# Patient Record
Sex: MALE | Race: WHITE | NOT HISPANIC OR LATINO | Employment: OTHER | URBAN - METROPOLITAN AREA
[De-identification: names, ages, dates, MRNs, and addresses within clinical notes are randomized per-mention and may not be internally consistent; named-entity substitution may affect disease eponyms.]

---

## 2022-05-16 ENCOUNTER — TELEPHONE (OUTPATIENT)
Dept: UROLOGY | Facility: CLINIC | Age: 27
End: 2022-05-16
Payer: MEDICARE

## 2022-05-25 DIAGNOSIS — Z93.59 SUPRAPUBIC CATHETER: Primary | ICD-10-CM

## 2022-05-26 ENCOUNTER — TELEPHONE (OUTPATIENT)
Dept: UROLOGY | Facility: CLINIC | Age: 27
End: 2022-05-26
Payer: MEDICARE

## 2022-05-26 NOTE — TELEPHONE ENCOUNTER
----- Message from Steffi Cabrera sent at 5/25/2022  4:59 PM CDT -----    ----- Message -----  From: Leslie Skelton  Sent: 5/25/2022  12:15 PM CDT  To: Samir Cota Staff    Type:  Needs Medical Advice    Who Called: Rehan Villareal    Symptoms (please be specific): -   How long has patient had these symptoms:  -  Pharmacy name and phone #:  -  Would the patient rather a call back or a response via [facility]?    Best Call Back Number: [phone]  Additional Information: pt's father Rehan Villareal wants to speak w/ Catherine to know if pt can be seen sooner than 06/20/2022 please call

## 2022-06-30 ENCOUNTER — OFFICE VISIT (OUTPATIENT)
Dept: UROLOGY | Facility: CLINIC | Age: 27
End: 2022-06-30
Payer: MEDICARE

## 2022-06-30 VITALS
DIASTOLIC BLOOD PRESSURE: 58 MMHG | WEIGHT: 116 LBS | HEIGHT: 64 IN | HEART RATE: 58 BPM | SYSTOLIC BLOOD PRESSURE: 133 MMHG | BODY MASS INDEX: 19.81 KG/M2

## 2022-06-30 DIAGNOSIS — R33.9 URINARY RETENTION: Primary | ICD-10-CM

## 2022-06-30 PROCEDURE — 99204 PR OFFICE/OUTPT VISIT, NEW, LEVL IV, 45-59 MIN: ICD-10-PCS | Mod: S$GLB,,, | Performed by: UROLOGY

## 2022-06-30 PROCEDURE — 99204 OFFICE O/P NEW MOD 45 MIN: CPT | Mod: S$GLB,,, | Performed by: UROLOGY

## 2022-06-30 NOTE — PROGRESS NOTES
Subjective:       Patient ID: Rehan Villareal is a 27 y.o. male.    Chief Complaint: Suprapubic tube      HPI:  27-year-old male with cerebral palsy and severe mental retardation unable urinate has had a suprapubic tube for many years he requires sedation to have this changed as he gets combative.  He is here in clinic for establishment of care    Past Medical History: History reviewed. No pertinent past medical history.    Past Surgical Historical: History reviewed. No pertinent surgical history.     Medications:      Past Social History:   Social History     Socioeconomic History    Marital status: Unknown       Allergies:   Review of patient's allergies indicates:   Allergen Reactions    Augmentin [amoxicillin-pot clavulanate]     Ketamine     Penicillins     Phenergan dm         Family History: History reviewed. No pertinent family history.     Review of Systems:  Review of Systems   Constitutional: Negative for activity change and appetite change.   HENT: Negative for congestion and dental problem.    Eyes: Negative for visual disturbance.   Respiratory: Negative for chest tightness and shortness of breath.    Cardiovascular: Negative for chest pain.   Gastrointestinal: Negative for abdominal distention and abdominal pain.   Genitourinary: Negative for decreased urine volume, difficulty urinating, dysuria, enuresis, flank pain, frequency, genital sores, hematuria, penile discharge, penile pain, penile swelling, scrotal swelling, testicular pain and urgency.   Musculoskeletal: Negative for back pain and neck pain.   Skin: Negative for color change.   Neurological: Negative for dizziness.   Hematological: Negative for adenopathy.   Psychiatric/Behavioral: Negative for agitation, behavioral problems and confusion.       Physical Exam:  Physical Exam  Constitutional:       General: He is not in acute distress.     Appearance: He is well-developed.   HENT:      Head: Normocephalic and atraumatic.      Nose: Nose  normal.   Eyes:      General: No scleral icterus.     Conjunctiva/sclera: Conjunctivae normal.      Pupils: Pupils are equal, round, and reactive to light.   Neck:      Thyroid: No thyromegaly.      Trachea: No tracheal deviation.   Cardiovascular:      Rate and Rhythm: Normal rate and regular rhythm.      Heart sounds: Normal heart sounds.   Pulmonary:      Effort: Pulmonary effort is normal. No respiratory distress.      Breath sounds: Normal breath sounds. No wheezing or rales.   Abdominal:      General: Bowel sounds are normal. There is no distension.      Palpations: Abdomen is soft.      Tenderness: There is no abdominal tenderness. There is no guarding or rebound.   Genitourinary:     Penis: Normal. No tenderness.       Prostate: Normal.   Musculoskeletal:         General: No deformity. Normal range of motion.      Cervical back: Neck supple.   Lymphadenopathy:      Cervical: No cervical adenopathy.   Skin:     General: Skin is warm and dry.      Findings: No erythema or rash.   Neurological:      Mental Status: He is alert and oriented to person, place, and time.      Cranial Nerves: No cranial nerve deficit.   Psychiatric:         Behavior: Behavior normal.         Assessment/Plan:       Problem List Items Addressed This Visit    None     Visit Diagnoses     Urinary retention    -  Primary             Urinary retention and him patient with severe MR:  We will set the patient up for suprapubic tube change in the hospital

## 2022-07-05 DIAGNOSIS — Z93.59 SUPRAPUBIC CATHETER: Primary | ICD-10-CM

## 2022-09-20 ENCOUNTER — TELEPHONE (OUTPATIENT)
Dept: UROLOGY | Facility: CLINIC | Age: 27
End: 2022-09-20
Payer: MEDICARE

## 2022-09-20 NOTE — TELEPHONE ENCOUNTER
Attemptd to contact pt father(also our pt needing jailyn same day as son.) left VM to return my call.

## 2022-10-04 ENCOUNTER — OFFICE VISIT (OUTPATIENT)
Dept: UROLOGY | Facility: CLINIC | Age: 27
End: 2022-10-04
Payer: MEDICARE

## 2022-10-04 VITALS
BODY MASS INDEX: 19.81 KG/M2 | WEIGHT: 116 LBS | DIASTOLIC BLOOD PRESSURE: 71 MMHG | HEIGHT: 64 IN | HEART RATE: 81 BPM | SYSTOLIC BLOOD PRESSURE: 125 MMHG

## 2022-10-04 DIAGNOSIS — Z93.59 SUPRAPUBIC CATHETER: Primary | ICD-10-CM

## 2022-10-04 PROCEDURE — 3008F BODY MASS INDEX DOCD: CPT | Mod: CPTII,S$GLB,, | Performed by: UROLOGY

## 2022-10-04 PROCEDURE — 3078F DIAST BP <80 MM HG: CPT | Mod: CPTII,S$GLB,, | Performed by: UROLOGY

## 2022-10-04 PROCEDURE — 1160F RVW MEDS BY RX/DR IN RCRD: CPT | Mod: CPTII,S$GLB,, | Performed by: UROLOGY

## 2022-10-04 PROCEDURE — 3074F SYST BP LT 130 MM HG: CPT | Mod: CPTII,S$GLB,, | Performed by: UROLOGY

## 2022-10-04 PROCEDURE — 3008F PR BODY MASS INDEX (BMI) DOCUMENTED: ICD-10-PCS | Mod: CPTII,S$GLB,, | Performed by: UROLOGY

## 2022-10-04 PROCEDURE — 99214 OFFICE O/P EST MOD 30 MIN: CPT | Mod: S$GLB,,, | Performed by: UROLOGY

## 2022-10-04 PROCEDURE — 1159F MED LIST DOCD IN RCRD: CPT | Mod: CPTII,S$GLB,, | Performed by: UROLOGY

## 2022-10-04 PROCEDURE — 3074F PR MOST RECENT SYSTOLIC BLOOD PRESSURE < 130 MM HG: ICD-10-PCS | Mod: CPTII,S$GLB,, | Performed by: UROLOGY

## 2022-10-04 PROCEDURE — 1159F PR MEDICATION LIST DOCUMENTED IN MEDICAL RECORD: ICD-10-PCS | Mod: CPTII,S$GLB,, | Performed by: UROLOGY

## 2022-10-04 PROCEDURE — 1160F PR REVIEW ALL MEDS BY PRESCRIBER/CLIN PHARMACIST DOCUMENTED: ICD-10-PCS | Mod: CPTII,S$GLB,, | Performed by: UROLOGY

## 2022-10-04 PROCEDURE — 3078F PR MOST RECENT DIASTOLIC BLOOD PRESSURE < 80 MM HG: ICD-10-PCS | Mod: CPTII,S$GLB,, | Performed by: UROLOGY

## 2022-10-04 PROCEDURE — 99214 PR OFFICE/OUTPT VISIT, EST, LEVL IV, 30-39 MIN: ICD-10-PCS | Mod: S$GLB,,, | Performed by: UROLOGY

## 2022-10-04 RX ORDER — CEFUROXIME AXETIL 500 MG/1
500 TABLET ORAL 2 TIMES DAILY
COMMUNITY
Start: 2022-09-24

## 2022-10-04 NOTE — PROGRESS NOTES
Subjective:       Patient ID: Rehan Villareal Jr. is a 27 y.o. male.    Chief Complaint: SP TUBE (CONSENTS)      HPI:  27-year-old male with urinary retention suprapubic tube in place patient is severely mentally retarded and combative with suprapubic tube changes he is here for preop for anesthesia with suprapubic tube change    Past Medical History:   Past Medical History:   Diagnosis Date    Mental disorder     MRSA (methicillin resistant Staphylococcus aureus)     Seizures        Past Surgical Historical:   Past Surgical History:   Procedure Laterality Date    BRAIN SURGERY      G tube       VENTRICULOPERITONEAL SHUNT          Medications:   Medication List with Changes/Refills   Current Medications    ACETAMINOPHEN (TYLENOL) 500 MG TABLET    Take 500 mg by mouth every 6 (six) hours as needed for Pain.    CEFUROXIME (CEFTIN) 500 MG TABLET    Take 500 mg by mouth 2 (two) times daily.    CITALOPRAM (CELEXA) 20 MG TABLET    Take 20 mg by mouth once daily.    CLONAZEPAM (KLONOPIN) 2 MG TAB    Take 1 tablet (2 mg total) by mouth nightly.    CLONIDINE (CATAPRES) 0.1 MG TABLET    Take 0.1 mg by mouth once daily.    DIGOXIN (LANOXIN) 125 MCG TABLET    Take 0.125 mg by mouth every morning.    DOCUSATE SODIUM (COLACE) 100 MG CAPSULE    Take 100 mg by mouth 2 (two) times daily.    EUTHYROX 75 MCG TABLET    Take 75 mcg by mouth every morning.    FYCOMPA 6 MG TABLET    Take 6 mg by mouth daily as needed.    IBUPROFEN 200 MG CAP    Take by mouth.    LACOSAMIDE (VIMPAT) 200 MG TAB TABLET    Take 1 tablet (200 mg total) by mouth 2 (two) times daily.    LAMOTRIGINE (LAMICTAL) 100 MG TABLET    Take 1 tablet (100 mg total) by mouth 2 (two) times daily.    LEVETIRACETAM (KEPPRA) 500 MG TAB    Take 1 tablet (500 mg total) by mouth 2 (two) times daily.    MELATONIN 5 MG CAP    Take by mouth.    METOPROLOL SUCCINATE (TOPROL-XL) 25 MG 24 HR TABLET    Take 25 mg by mouth 2 (two) times daily.    MIRTAZAPINE (REMERON) 15 MG TABLET    Take 15  mg by mouth nightly.    MONTELUKAST (SINGULAIR) 10 MG TABLET    Take 10 mg by mouth nightly.    PANTOPRAZOLE (PROTONIX) 20 MG TABLET    SMARTSI Tablet(s) By Mouth Every Evening    PERAMPANEL (FYCOMPA) 6 MG TABLET    Take 1 tablet (6 mg total) by mouth every evening.        Past Social History:   Social History     Socioeconomic History    Marital status: Unknown   Tobacco Use    Smoking status: Never    Smokeless tobacco: Never   Substance and Sexual Activity    Alcohol use: Not Currently    Drug use: Not Currently       Allergies:   Review of patient's allergies indicates:   Allergen Reactions    Augmentin [amoxicillin-pot clavulanate]     Ketamine     Penicillins     Phenergan dm         Family History: History reviewed. No pertinent family history.     Review of Systems:  Review of Systems    Physical Exam:  Physical Exam    Assessment/Plan:       Problem List Items Addressed This Visit    None  Visit Diagnoses       Suprapubic catheter    -  Primary    Relevant Orders    Ambulatory Referral to External Surgery               What preop for 6 suprapubic tube change under anesthesia

## 2022-10-12 LAB
ANION GAP SERPL CALC-SCNC: 11 MMOL/L (ref 3–11)
APPEARANCE, UA: ABNORMAL
BILIRUB UR QL STRIP: NEGATIVE MG/DL
BUN SERPL-MCNC: 12 MG/DL (ref 7–18)
BUN/CREAT SERPL: 10.34 RATIO (ref 7–18)
CALCIUM SERPL-MCNC: 9.5 MG/DL (ref 8.8–10.5)
CELLS COUNTED: 100
CHLORIDE SERPL-SCNC: 102 MMOL/L (ref 100–108)
CO2 SERPL-SCNC: 28 MMOL/L (ref 21–32)
COLOR UR: ABNORMAL
CREAT SERPL-MCNC: 1.16 MG/DL (ref 0.7–1.3)
EOSINOPHIL NFR BLD: 13 % (ref 1–3)
ERYTHROCYTE [DISTWIDTH] IN BLOOD BY AUTOMATED COUNT: 11.3 % (ref 12.5–18)
GFR ESTIMATION: > 60
GLUCOSE (UA): NORMAL MG/DL
GLUCOSE SERPL-MCNC: 90 MG/DL (ref 70–110)
HCT VFR BLD AUTO: 41.6 % (ref 42–52)
HGB BLD-MCNC: 13.9 G/DL (ref 14–18)
HGB UR QL STRIP: 10 /UL
KETONES UR QL STRIP: NEGATIVE MG/DL
LEUKOCYTE ESTERASE UR QL STRIP: 500 /UL
LYMPHOCYTES NFR BLD: 53 % (ref 25–40)
MCH RBC QN AUTO: 31.9 PG (ref 27–31.2)
MCHC RBC AUTO-ENTMCNC: 33.4 G/DL (ref 31.8–35.4)
MCV RBC AUTO: 95.4 FL (ref 80–97)
MONOCYTES NFR BLD: 8 % (ref 1–15)
NEUTROPHILS # BLD AUTO: 2 10*3/UL (ref 1.8–7.7)
NEUTROPHILS NFR BLD: 26 % (ref 37–80)
NITRITE UR QL STRIP: NEGATIVE
NUCLEATED RED BLOOD CELLS: 0 %
PH UR STRIP: 8 PH (ref 5–9)
PLATELETS: 235 10*3/UL (ref 142–424)
POTASSIUM SERPL-SCNC: 4.5 MMOL/L (ref 3.6–5.2)
PROT UR QL STRIP: NEGATIVE MG/DL
RBC # BLD AUTO: 4.36 10*6/UL (ref 4.7–6.1)
RBC MORPH BLD: ABNORMAL
SMALL PLATELETS BLD QL SMEAR: ADEQUATE
SODIUM BLD-SCNC: 141 MMOL/L (ref 135–145)
SP GR UR STRIP: 1.01 (ref 1–1.03)
SPECIMEN COLLECTION METHOD, URINE: ABNORMAL
UROBILINOGEN UR STRIP-ACNC: NORMAL MG/DL
WBC # BLD: 7.7 10*3/UL (ref 4.6–10.2)

## 2022-10-17 ENCOUNTER — OUTSIDE PLACE OF SERVICE (OUTPATIENT)
Dept: UROLOGY | Facility: CLINIC | Age: 27
End: 2022-10-17

## 2022-10-17 PROCEDURE — 51705 PR CHANGE OF BLADDER TUBE,SIMPLE: ICD-10-PCS | Mod: ,,, | Performed by: UROLOGY

## 2022-10-17 PROCEDURE — 51705 CHANGE OF BLADDER TUBE: CPT | Mod: ,,, | Performed by: UROLOGY

## 2022-12-21 ENCOUNTER — TELEPHONE (OUTPATIENT)
Dept: UROLOGY | Facility: CLINIC | Age: 27
End: 2022-12-21

## 2022-12-21 NOTE — TELEPHONE ENCOUNTER
Mother rescheudled patient nurse visit to 12/27/2022, stated she would take patient to ER with symptoms, fever, etc.

## 2022-12-21 NOTE — TELEPHONE ENCOUNTER
----- Message from Kulwinder Tyler sent at 12/21/2022 11:26 AM CST -----  Contact: dad    Who Called:pt dad  Who Left Message for Patient:  Does the patient know what this is regarding?:pt wants to r/s nurse visit to tmr morning   Would the patient rather a call back or a response via [facility]?   Best Call Back Number:.[phone]    Additional Information:

## 2022-12-21 NOTE — TELEPHONE ENCOUNTER
Pt son needs to do a urine drop off to check for a UTI.  Will put on [place] schedule for urine drop off----- Message from Priscilla Sherwood sent at 12/21/2022  8:40 AM CST -----  Contact: Rehan Villareal Sr. - father to pt  Type:  Needs Medical Advice    Who Called: Rehan Villareal Sr. - father to pt  Symptoms (please be specific): UTI   How long has patient had these symptoms:  A few days - he has a super pubic catheter and usually gets them pretty bad, so needs to be started off on the stronger antibiotics  Pharmacy name and phone #:    [facility] - [place], [place] - [address]  [address]  [place] [address]  Phone: [phone] Fax: [phone]     Would the patient rather a call back or a response via [facility]? Call back   Best Call Back Number: [phone]  Additional Information: Na

## 2022-12-29 ENCOUNTER — OFFICE VISIT (OUTPATIENT)
Dept: UROLOGY | Facility: CLINIC | Age: 27
End: 2022-12-29
Payer: MEDICARE

## 2022-12-29 DIAGNOSIS — R33.9 URINARY RETENTION: ICD-10-CM

## 2022-12-29 DIAGNOSIS — Z93.59 SUPRAPUBIC CATHETER: Primary | ICD-10-CM

## 2022-12-29 PROCEDURE — 99215 PR OFFICE/OUTPT VISIT, EST, LEVL V, 40-54 MIN: ICD-10-PCS | Mod: S$GLB,,, | Performed by: UROLOGY

## 2022-12-29 PROCEDURE — 99215 OFFICE O/P EST HI 40 MIN: CPT | Mod: S$GLB,,, | Performed by: UROLOGY

## 2022-12-29 PROCEDURE — 1159F PR MEDICATION LIST DOCUMENTED IN MEDICAL RECORD: ICD-10-PCS | Mod: CPTII,S$GLB,, | Performed by: UROLOGY

## 2022-12-29 PROCEDURE — 1159F MED LIST DOCD IN RCRD: CPT | Mod: CPTII,S$GLB,, | Performed by: UROLOGY

## 2022-12-29 PROCEDURE — 1160F RVW MEDS BY RX/DR IN RCRD: CPT | Mod: CPTII,S$GLB,, | Performed by: UROLOGY

## 2022-12-29 PROCEDURE — 1160F PR REVIEW ALL MEDS BY PRESCRIBER/CLIN PHARMACIST DOCUMENTED: ICD-10-PCS | Mod: CPTII,S$GLB,, | Performed by: UROLOGY

## 2022-12-29 NOTE — PROGRESS NOTES
Subjective:       Patient ID: Rehan Villareal Jr. is a 27 y.o. male.    Chief Complaint: Suprapubic catheter      HPI:  27-year-old male with urinary retention and severe mental retardation chronic suprapubic tube here for preop exchange of his tube.    Past Medical History:   Past Medical History:   Diagnosis Date    Mental disorder     MRSA (methicillin resistant Staphylococcus aureus)     Seizures        Past Surgical Historical:   Past Surgical History:   Procedure Laterality Date    BRAIN SURGERY      G tube       VENTRICULOPERITONEAL SHUNT          Medications:   Medication List with Changes/Refills   Current Medications    ACETAMINOPHEN (TYLENOL) 500 MG TABLET    Take 500 mg by mouth every 6 (six) hours as needed for Pain.    CEFUROXIME (CEFTIN) 500 MG TABLET    Take 500 mg by mouth 2 (two) times daily.    CITALOPRAM (CELEXA) 20 MG TABLET    Take 20 mg by mouth once daily.    CLONAZEPAM (KLONOPIN) 2 MG TAB    Take 1 tablet (2 mg total) by mouth nightly.    CLONIDINE (CATAPRES) 0.1 MG TABLET    Take 0.1 mg by mouth once daily.    DIGOXIN (LANOXIN) 125 MCG TABLET    Take 0.125 mg by mouth every morning.    DOCUSATE SODIUM (COLACE) 100 MG CAPSULE    Take 100 mg by mouth 2 (two) times daily.    EUTHYROX 75 MCG TABLET    Take 75 mcg by mouth every morning.    FYCOMPA 6 MG TABLET    Take 6 mg by mouth daily as needed.    IBUPROFEN 200 MG CAP    Take by mouth.    LACOSAMIDE (VIMPAT) 200 MG TAB TABLET    Take 1 tablet (200 mg total) by mouth 2 (two) times daily.    LACOSAMIDE (VIMPAT) 200 MG TAB TABLET    Take 1 tablet (200 mg total) by mouth every 12 (twelve) hours.    LAMOTRIGINE (LAMICTAL) 100 MG TABLET    Take 1 tablet (100 mg total) by mouth 2 (two) times daily.    LEVETIRACETAM (KEPPRA) 500 MG TAB    Take 1 tablet (500 mg total) by mouth 2 (two) times daily.    MELATONIN 5 MG CAP    Take by mouth.    METOPROLOL SUCCINATE (TOPROL-XL) 25 MG 24 HR TABLET    Take 25 mg by mouth 2 (two) times daily.    MIRTAZAPINE  (REMERON) 15 MG TABLET    Take 15 mg by mouth nightly.    MONTELUKAST (SINGULAIR) 10 MG TABLET    Take 10 mg by mouth nightly.    PANTOPRAZOLE (PROTONIX) 20 MG TABLET    SMARTSI Tablet(s) By Mouth Every Evening    PERAMPANEL (FYCOMPA) 6 MG TABLET    Take 1 tablet (6 mg total) by mouth every evening.        Past Social History:   Social History     Socioeconomic History    Marital status: Unknown   Tobacco Use    Smoking status: Never    Smokeless tobacco: Never   Substance and Sexual Activity    Alcohol use: Not Currently    Drug use: Not Currently       Allergies:   Review of patient's allergies indicates:   Allergen Reactions    Augmentin [amoxicillin-pot clavulanate]     Ketamine     Penicillins     Phenergan dm         Family History: History reviewed. No pertinent family history.     Review of Systems:  Review of Systems   Constitutional:  Negative for activity change and appetite change.   HENT:  Negative for congestion and dental problem.    Eyes:  Negative for visual disturbance.   Respiratory:  Negative for chest tightness and shortness of breath.    Cardiovascular:  Negative for chest pain.   Gastrointestinal:  Negative for abdominal distention and abdominal pain.   Endocrine: Negative for polyuria.   Genitourinary:  Negative for difficulty urinating, dysuria, flank pain, frequency, hematuria, penile discharge, penile pain, penile swelling, scrotal swelling, testicular pain and urgency.   Musculoskeletal:  Negative for back pain and neck pain.   Skin:  Negative for color change.   Allergic/Immunologic: Positive for immunocompromised state.   Neurological:  Negative for dizziness.   Hematological:  Negative for adenopathy.   Psychiatric/Behavioral:  Negative for agitation, behavioral problems and confusion.      Physical Exam:  Physical Exam  Constitutional:       General: He is not in acute distress.     Appearance: He is well-developed.   HENT:      Head: Normocephalic and atraumatic.      Nose: Nose  normal.   Eyes:      General: No scleral icterus.     Conjunctiva/sclera: Conjunctivae normal.      Pupils: Pupils are equal, round, and reactive to light.   Neck:      Thyroid: No thyromegaly.      Trachea: No tracheal deviation.   Cardiovascular:      Rate and Rhythm: Normal rate and regular rhythm.      Heart sounds: Normal heart sounds.   Pulmonary:      Effort: Pulmonary effort is normal. No respiratory distress.      Breath sounds: Normal breath sounds. No wheezing or rales.   Abdominal:      General: Bowel sounds are normal. There is no distension.      Palpations: Abdomen is soft.      Tenderness: There is no abdominal tenderness. There is no guarding or rebound.   Genitourinary:     Penis: Normal. No tenderness.       Prostate: Normal.   Musculoskeletal:         General: No deformity. Normal range of motion.      Cervical back: Neck supple.   Lymphadenopathy:      Cervical: No cervical adenopathy.   Skin:     General: Skin is warm and dry.      Findings: No erythema or rash.   Neurological:      Mental Status: He is alert and oriented to person, place, and time.      Cranial Nerves: No cranial nerve deficit.   Psychiatric:         Behavior: Behavior normal.       Assessment/Plan:       Problem List Items Addressed This Visit    None  Visit Diagnoses       Suprapubic catheter    -  Primary    Relevant Orders    Ambulatory Referral to External Surgery    Urinary retention                   Urinary retention severe mental retardation and combative behavior with prior awake SP tube changes:  We will proceed to the operating room to exchange his suprapubic tube under anesthesia

## 2023-01-04 LAB
ANION GAP SERPL CALC-SCNC: 12 MMOL/L (ref 3–11)
APPEARANCE, UA: CLEAR
BACTERIA SPEC CULT: ABNORMAL /HPF
BANDS: 1 % (ref 0–5)
BILIRUB UR QL STRIP: NEGATIVE MG/DL
BUN SERPL-MCNC: 20 MG/DL (ref 7–18)
BUN/CREAT SERPL: 17.24 RATIO (ref 7–18)
CALCIUM SERPL-MCNC: 10 MG/DL (ref 8.8–10.5)
CELLS COUNTED: 100
CHLORIDE SERPL-SCNC: 102 MMOL/L (ref 100–108)
CO2 SERPL-SCNC: 27 MMOL/L (ref 21–32)
COLOR UR: ABNORMAL
CREAT SERPL-MCNC: 1.16 MG/DL (ref 0.7–1.3)
EOSINOPHIL NFR BLD: 11 % (ref 1–3)
ERYTHROCYTE [DISTWIDTH] IN BLOOD BY AUTOMATED COUNT: 11.6 % (ref 12.5–18)
GFR ESTIMATION: > 60
GLUCOSE (UA): NORMAL MG/DL
GLUCOSE SERPL-MCNC: 96 MG/DL (ref 70–110)
HCT VFR BLD AUTO: 44.2 % (ref 42–52)
HGB BLD-MCNC: 15 G/DL (ref 14–18)
HGB UR QL STRIP: 150 /UL
KETONES UR QL STRIP: NEGATIVE MG/DL
LEUKOCYTE ESTERASE UR QL STRIP: 100 /UL
LYMPHOCYTES NFR BLD: 57 % (ref 25–40)
MCH RBC QN AUTO: 31.3 PG (ref 27–31.2)
MCHC RBC AUTO-ENTMCNC: 33.9 G/DL (ref 31.8–35.4)
MCV RBC AUTO: 92.3 FL (ref 80–97)
MONOCYTES NFR BLD: 6 % (ref 1–15)
NEUTROPHILS # BLD AUTO: 1.9 10*3/UL (ref 1.8–7.7)
NEUTROPHILS NFR BLD: 25 % (ref 37–80)
NITRITE UR QL STRIP: NEGATIVE
NUCLEATED RED BLOOD CELLS: 0 %
PH UR STRIP: 6 PH (ref 5–9)
PLATELETS: 283 10*3/UL (ref 142–424)
POTASSIUM SERPL-SCNC: 4.2 MMOL/L (ref 3.6–5.2)
PROT UR QL STRIP: NEGATIVE MG/DL
RBC # BLD AUTO: 4.79 10*6/UL (ref 4.7–6.1)
RBC #/AREA URNS HPF: ABNORMAL /HPF (ref 0–2)
RBC MORPH BLD: ABNORMAL
SERVICE COMMENT 03: ABNORMAL
SMALL PLATELETS BLD QL SMEAR: ADEQUATE
SODIUM BLD-SCNC: 141 MMOL/L (ref 135–145)
SP GR UR STRIP: 1 (ref 1–1.03)
SPECIMEN COLLECTION METHOD, URINE: ABNORMAL
SQUAMOUS EPITHELIAL, UA: ABNORMAL /LPF
UROBILINOGEN UR STRIP-ACNC: NORMAL MG/DL
WBC # BLD: 7.4 10*3/UL (ref 4.6–10.2)
WBC #/AREA URNS HPF: ABNORMAL /HPF (ref 0–5)

## 2023-01-11 ENCOUNTER — OUTSIDE PLACE OF SERVICE (OUTPATIENT)
Dept: UROLOGY | Facility: CLINIC | Age: 28
End: 2023-01-11
Payer: MEDICARE

## 2023-01-11 PROCEDURE — 52000 PR CYSTOURETHROSCOPY: ICD-10-PCS | Mod: ,,, | Performed by: UROLOGY

## 2023-01-11 PROCEDURE — 52000 CYSTOURETHROSCOPY: CPT | Mod: ,,, | Performed by: UROLOGY

## 2023-01-25 ENCOUNTER — TELEPHONE (OUTPATIENT)
Dept: UROLOGY | Facility: CLINIC | Age: 28
End: 2023-01-25
Payer: MEDICARE

## 2023-01-25 NOTE — TELEPHONE ENCOUNTER
----- Message from Kate Rivera sent at 1/25/2023  9:56 AM CST -----  Contact: Dr Cassidy Singh([facility])  Dr. Singh called to consult with Dr. Acuña regarding the patient. She states she had a question regarding his catheter and would like a call back. She can be reached at [phone]

## 2023-03-06 ENCOUNTER — TELEPHONE (OUTPATIENT)
Dept: UROLOGY | Facility: CLINIC | Age: 28
End: 2023-03-06

## 2023-03-06 NOTE — TELEPHONE ENCOUNTER
Patient was a no call/no show for nurse visit to sign consents for SP tube change. Attempted to call father, message said number was not active. Also tried to call sister, message stated number not in service.     CHAI Glass RN

## 2023-03-13 ENCOUNTER — TELEPHONE (OUTPATIENT)
Dept: UROLOGY | Facility: CLINIC | Age: 28
End: 2023-03-13
Payer: MEDICARE

## 2023-03-13 NOTE — TELEPHONE ENCOUNTER
Pt dad calling with possible infection. I have set pt up for consents(sptube change) and ua drop off     ----- Message from Cassidy Gaitan sent at 3/13/2023  2:34 PM CDT -----  Regarding: appt access  Contact: Rehan/  Type:  Sooner Apoointment Request    Caller is requesting a sooner appointment.  Caller declined first available appointment listed below.  Caller will not accept being placed on the waitlist and is requesting a message be sent to doctor.  Name of Caller: Rehan/   When is the first available appointment? 05/04/23  Symptoms: follow up/schedule procedure for SP cath change  Would the patient rather a call back or a response via [facility]?  Call back   Best Call Back Number: [phone]  Additional Information:  states that pt is having sediment in his bag and has a foul odor.

## 2023-03-14 ENCOUNTER — TELEPHONE (OUTPATIENT)
Dept: UROLOGY | Facility: CLINIC | Age: 28
End: 2023-03-14
Payer: MEDICARE

## 2023-03-14 NOTE — PROGRESS NOTES
Subjective:       Patient ID: Rehan Villareal Jr. is a 27 y.o. male.    Chief Complaint: No chief complaint on file.      HPI: updated H&P    Past Medical History:   Past Medical History:   Diagnosis Date    Mental disorder     MRSA (methicillin resistant Staphylococcus aureus)     Seizures        Past Surgical Historical:   Past Surgical History:   Procedure Laterality Date    BRAIN SURGERY      G tube       VENTRICULOPERITONEAL SHUNT          Medications:   Medication List with Changes/Refills   Current Medications    ACETAMINOPHEN (TYLENOL) 500 MG TABLET    Take 500 mg by mouth every 6 (six) hours as needed for Pain.    CEFUROXIME (CEFTIN) 500 MG TABLET    Take 500 mg by mouth 2 (two) times daily.    CITALOPRAM (CELEXA) 20 MG TABLET    Take 20 mg by mouth once daily.    CLONAZEPAM (KLONOPIN) 2 MG TAB    Take 1 tablet (2 mg total) by mouth nightly.    CLONIDINE (CATAPRES) 0.1 MG TABLET    Take 0.1 mg by mouth once daily.    DIGOXIN (LANOXIN) 125 MCG TABLET    Take 0.125 mg by mouth every morning.    DOCUSATE SODIUM (COLACE) 100 MG CAPSULE    Take 100 mg by mouth 2 (two) times daily.    EUTHYROX 75 MCG TABLET    Take 75 mcg by mouth every morning.    FYCOMPA 6 MG TABLET    Take 6 mg by mouth daily as needed.    IBUPROFEN 200 MG CAP    Take by mouth.    LACOSAMIDE (VIMPAT) 200 MG TAB TABLET    Take 1 tablet (200 mg total) by mouth 2 (two) times daily.    LACOSAMIDE (VIMPAT) 200 MG TAB TABLET    Take 1 tablet (200 mg total) by mouth every 12 (twelve) hours.    LAMOTRIGINE (LAMICTAL) 100 MG TABLET    Take 1 tablet (100 mg total) by mouth 2 (two) times daily.    LEVETIRACETAM (KEPPRA) 500 MG TAB    Take 1 tablet (500 mg total) by mouth 2 (two) times daily.    MELATONIN 5 MG CAP    Take by mouth.    METOPROLOL SUCCINATE (TOPROL-XL) 25 MG 24 HR TABLET    Take 25 mg by mouth 2 (two) times daily.    MIRTAZAPINE (REMERON) 15 MG TABLET    Take 15 mg by mouth nightly.    MONTELUKAST (SINGULAIR) 10 MG TABLET    Take 10 mg by  mouth nightly.    PANTOPRAZOLE (PROTONIX) 20 MG TABLET    SMARTSI Tablet(s) By Mouth Every Evening    PERAMPANEL (FYCOMPA) 6 MG TABLET    Take 1 tablet (6 mg total) by mouth every evening.        Past Social History:   Social History     Socioeconomic History    Marital status: Unknown   Tobacco Use    Smoking status: Never    Smokeless tobacco: Never   Substance and Sexual Activity    Alcohol use: Not Currently    Drug use: Not Currently       Allergies:   Review of patient's allergies indicates:   Allergen Reactions    Augmentin [amoxicillin-pot clavulanate]     Ketamine     Penicillins     Phenergan dm         Family History: No family history on file.     Review of Systems:  Review of Systems    Physical Exam:  Physical Exam    Assessment/Plan:       Problem List Items Addressed This Visit    None         Proceed with surgery

## 2023-03-14 NOTE — TELEPHONE ENCOUNTER
----- Message from Maryellen Cooper MA sent at 3/13/2023  3:53 PM CDT -----  Regarding: FW: appt access  Contact: Rehan/  Update h&p  ----- Message -----  From: Cassidy Gaitan  Sent: 3/13/2023   2:38 PM CDT  To: Arianne Rehman Staff  Subject: appt access                                      Type:  Sooner Apoointment Request    Caller is requesting a sooner appointment.  Caller declined first available appointment listed below.  Caller will not accept being placed on the waitlist and is requesting a message be sent to doctor.  Name of Caller: Rehan/   When is the first available appointment? 05/04/23  Symptoms: follow up/schedule procedure for SP cath change  Would the patient rather a call back or a response via [facility]?  Call back   Best Call Back Number: [phone]  Additional Information:  states that pt is having sediment in his bag and has a foul odor.

## 2023-03-17 ENCOUNTER — CLINICAL SUPPORT (OUTPATIENT)
Dept: UROLOGY | Facility: CLINIC | Age: 28
End: 2023-03-17
Payer: MEDICARE

## 2023-03-17 DIAGNOSIS — R33.9 URINARY RETENTION: ICD-10-CM

## 2023-03-17 DIAGNOSIS — R82.90 FOUL SMELLING URINE: ICD-10-CM

## 2023-03-17 DIAGNOSIS — R82.90 ABNORMAL URINALYSIS: ICD-10-CM

## 2023-03-17 DIAGNOSIS — Z93.59 SUPRAPUBIC CATHETER: Primary | ICD-10-CM

## 2023-03-17 NOTE — PROGRESS NOTES
Patient educated, consent signed, pre-admission paperwork given. Patient verbalized understanding. [facility] Jolene

## 2023-03-20 LAB
ANION GAP SERPL CALC-SCNC: 16 MMOL/L (ref 3–11)
APPEARANCE, UA: CLEAR
BACTERIA SPEC CULT: ABNORMAL /HPF
BASOPHILS NFR BLD: 1 % (ref 0–3)
BILIRUB UR QL STRIP: NEGATIVE MG/DL
BUN SERPL-MCNC: 13 MG/DL (ref 7–18)
BUN/CREAT SERPL: 10.4 RATIO (ref 7–18)
CALCIUM SERPL-MCNC: 10.6 MG/DL (ref 8.8–10.5)
CHLORIDE SERPL-SCNC: 103 MMOL/L (ref 100–108)
CO2 SERPL-SCNC: 27 MMOL/L (ref 21–32)
COLOR UR: ABNORMAL
CREAT SERPL-MCNC: 1.25 MG/DL (ref 0.7–1.3)
EOSINOPHIL NFR BLD: 7 % (ref 1–3)
ERYTHROCYTE [DISTWIDTH] IN BLOOD BY AUTOMATED COUNT: 11.9 % (ref 12.5–18)
GFR ESTIMATION: > 60
GLUCOSE (UA): NORMAL MG/DL
GLUCOSE SERPL-MCNC: 99 MG/DL (ref 70–110)
HCT VFR BLD AUTO: 45.6 % (ref 42–52)
HGB BLD-MCNC: 15.6 G/DL (ref 14–18)
HGB UR QL STRIP: 10 /UL
KETONES UR QL STRIP: NEGATIVE MG/DL
LEUKOCYTE ESTERASE UR QL STRIP: 500 /UL
LYMPHOCYTES NFR BLD: 43.8 % (ref 25–40)
MCH RBC QN AUTO: 31.6 PG (ref 27–31.2)
MCHC RBC AUTO-ENTMCNC: 34.2 G/DL (ref 31.8–35.4)
MCV RBC AUTO: 92.5 FL (ref 80–97)
MONOCYTES NFR BLD: 8 % (ref 1–15)
NEUTROPHILS # BLD AUTO: 2.85 10*3/UL (ref 1.8–7.7)
NEUTROPHILS NFR BLD: 40.1 % (ref 37–80)
NITRITE UR QL STRIP: POSITIVE
NUCLEATED RED BLOOD CELLS: 0 %
PH UR STRIP: 8 PH (ref 5–9)
PLATELETS: 292 10*3/UL (ref 142–424)
POTASSIUM SERPL-SCNC: 5.6 MMOL/L (ref 3.6–5.2)
PROT UR QL STRIP: ABNORMAL MG/DL
RBC # BLD AUTO: 4.93 10*6/UL (ref 4.7–6.1)
RBC #/AREA URNS HPF: ABNORMAL /HPF (ref 0–2)
SERVICE COMMENT 03: ABNORMAL
SODIUM BLD-SCNC: 146 MMOL/L (ref 135–145)
SP GR UR STRIP: 1.01 (ref 1–1.03)
SPECIMEN COLLECTION METHOD, URINE: ABNORMAL
SQUAMOUS EPITHELIAL, UA: ABNORMAL /LPF
UROBILINOGEN UR STRIP-ACNC: NORMAL MG/DL
WBC # BLD: 7.1 10*3/UL (ref 4.6–10.2)
WBC #/AREA URNS HPF: ABNORMAL /HPF (ref 0–5)

## 2023-03-21 DIAGNOSIS — Z93.59 SUPRAPUBIC CATHETER: Primary | ICD-10-CM

## 2023-03-21 LAB — URINE CULTURE, ROUTINE: NORMAL

## 2023-03-22 ENCOUNTER — OUTSIDE PLACE OF SERVICE (OUTPATIENT)
Dept: UROLOGY | Facility: CLINIC | Age: 28
End: 2023-03-22

## 2023-03-22 PROCEDURE — 51705 PR CHANGE OF BLADDER TUBE,SIMPLE: ICD-10-PCS | Mod: ,,, | Performed by: UROLOGY

## 2023-03-22 PROCEDURE — 51705 CHANGE OF BLADDER TUBE: CPT | Mod: ,,, | Performed by: UROLOGY

## 2023-03-22 RX ORDER — CIPROFLOXACIN 500 MG/1
500 TABLET ORAL 2 TIMES DAILY
Qty: 6 TABLET | Refills: 0 | Status: SHIPPED | OUTPATIENT
Start: 2023-03-22 | End: 2023-03-25